# Patient Record
Sex: FEMALE | Race: OTHER | URBAN - METROPOLITAN AREA
[De-identification: names, ages, dates, MRNs, and addresses within clinical notes are randomized per-mention and may not be internally consistent; named-entity substitution may affect disease eponyms.]

---

## 2017-07-18 ENCOUNTER — INPATIENT (INPATIENT)
Facility: HOSPITAL | Age: 57
LOS: 1 days | Discharge: HOME | DRG: 392 | End: 2017-07-20
Attending: INTERNAL MEDICINE | Admitting: INTERNAL MEDICINE
Payer: COMMERCIAL

## 2017-07-18 VITALS
OXYGEN SATURATION: 99 % | SYSTOLIC BLOOD PRESSURE: 157 MMHG | TEMPERATURE: 100 F | HEART RATE: 65 BPM | RESPIRATION RATE: 18 BRPM | DIASTOLIC BLOOD PRESSURE: 80 MMHG

## 2017-07-18 DIAGNOSIS — K52.9 NONINFECTIVE GASTROENTERITIS AND COLITIS, UNSPECIFIED: ICD-10-CM

## 2017-07-18 DIAGNOSIS — Z29.9 ENCOUNTER FOR PROPHYLACTIC MEASURES, UNSPECIFIED: ICD-10-CM

## 2017-07-18 DIAGNOSIS — E78.00 PURE HYPERCHOLESTEROLEMIA, UNSPECIFIED: ICD-10-CM

## 2017-07-18 DIAGNOSIS — Z98.891 HISTORY OF UTERINE SCAR FROM PREVIOUS SURGERY: Chronic | ICD-10-CM

## 2017-07-18 DIAGNOSIS — R55 SYNCOPE AND COLLAPSE: ICD-10-CM

## 2017-07-18 DIAGNOSIS — I10 ESSENTIAL (PRIMARY) HYPERTENSION: ICD-10-CM

## 2017-07-18 LAB
ALBUMIN SERPL ELPH-MCNC: 4.8 G/DL — SIGNIFICANT CHANGE UP (ref 3.3–5)
ALP SERPL-CCNC: 79 U/L — SIGNIFICANT CHANGE UP (ref 40–120)
ALT FLD-CCNC: 27 U/L RC — SIGNIFICANT CHANGE UP (ref 10–45)
ANION GAP SERPL CALC-SCNC: 17 MMOL/L — SIGNIFICANT CHANGE UP (ref 5–17)
APTT BLD: 24.7 SEC — LOW (ref 27.5–37.4)
AST SERPL-CCNC: 26 U/L — SIGNIFICANT CHANGE UP (ref 10–40)
BASE EXCESS BLDV CALC-SCNC: 0.2 MMOL/L — SIGNIFICANT CHANGE UP (ref -2–2)
BASOPHILS # BLD AUTO: 0.1 K/UL — SIGNIFICANT CHANGE UP (ref 0–0.2)
BASOPHILS # BLD AUTO: 0.1 K/UL — SIGNIFICANT CHANGE UP (ref 0–0.2)
BASOPHILS NFR BLD AUTO: 0.8 % — SIGNIFICANT CHANGE UP (ref 0–2)
BASOPHILS NFR BLD AUTO: 1 % — SIGNIFICANT CHANGE UP (ref 0–2)
BILIRUB SERPL-MCNC: 1.2 MG/DL — SIGNIFICANT CHANGE UP (ref 0.2–1.2)
BUN SERPL-MCNC: 13 MG/DL — SIGNIFICANT CHANGE UP (ref 7–23)
CA-I SERPL-SCNC: 1.21 MMOL/L — SIGNIFICANT CHANGE UP (ref 1.12–1.3)
CALCIUM SERPL-MCNC: 9.5 MG/DL — SIGNIFICANT CHANGE UP (ref 8.4–10.5)
CHLORIDE BLDV-SCNC: 102 MMOL/L — SIGNIFICANT CHANGE UP (ref 96–108)
CHLORIDE SERPL-SCNC: 99 MMOL/L — SIGNIFICANT CHANGE UP (ref 96–108)
CO2 BLDV-SCNC: 27 MMOL/L — SIGNIFICANT CHANGE UP (ref 22–30)
CO2 SERPL-SCNC: 20 MMOL/L — LOW (ref 22–31)
CREAT SERPL-MCNC: 0.63 MG/DL — SIGNIFICANT CHANGE UP (ref 0.5–1.3)
EOSINOPHIL # BLD AUTO: 0 K/UL — SIGNIFICANT CHANGE UP (ref 0–0.5)
EOSINOPHIL # BLD AUTO: 0 K/UL — SIGNIFICANT CHANGE UP (ref 0–0.5)
EOSINOPHIL NFR BLD AUTO: 0.3 % — SIGNIFICANT CHANGE UP (ref 0–6)
EOSINOPHIL NFR BLD AUTO: 0.4 % — SIGNIFICANT CHANGE UP (ref 0–6)
GAS PNL BLDV: 135 MMOL/L — LOW (ref 136–145)
GAS PNL BLDV: SIGNIFICANT CHANGE UP
GAS PNL BLDV: SIGNIFICANT CHANGE UP
GLUCOSE BLDV-MCNC: 116 MG/DL — HIGH (ref 70–99)
GLUCOSE SERPL-MCNC: 111 MG/DL — HIGH (ref 70–99)
HCO3 BLDV-SCNC: 26 MMOL/L — SIGNIFICANT CHANGE UP (ref 21–29)
HCT VFR BLD CALC: 41.4 % — SIGNIFICANT CHANGE UP (ref 34.5–45)
HCT VFR BLD CALC: 42.2 % — SIGNIFICANT CHANGE UP (ref 34.5–45)
HCT VFR BLD CALC: 46.6 % — HIGH (ref 34.5–45)
HCT VFR BLDA CALC: 44 % — SIGNIFICANT CHANGE UP (ref 39–50)
HGB BLD CALC-MCNC: 14.2 G/DL — SIGNIFICANT CHANGE UP (ref 11.5–15.5)
HGB BLD-MCNC: 14.1 G/DL — SIGNIFICANT CHANGE UP (ref 11.5–15.5)
HGB BLD-MCNC: 14.3 G/DL — SIGNIFICANT CHANGE UP (ref 11.5–15.5)
HGB BLD-MCNC: 15.4 G/DL — SIGNIFICANT CHANGE UP (ref 11.5–15.5)
INR BLD: 0.99 RATIO — SIGNIFICANT CHANGE UP (ref 0.88–1.16)
LACTATE BLDV-MCNC: 1.5 MMOL/L — SIGNIFICANT CHANGE UP (ref 0.7–2)
LACTATE SERPL-SCNC: 1.4 MMOL/L — SIGNIFICANT CHANGE UP (ref 0.7–2)
LYMPHOCYTES # BLD AUTO: 1.4 K/UL — SIGNIFICANT CHANGE UP (ref 1–3.3)
LYMPHOCYTES # BLD AUTO: 17.8 % — SIGNIFICANT CHANGE UP (ref 13–44)
LYMPHOCYTES # BLD AUTO: 2.2 K/UL — SIGNIFICANT CHANGE UP (ref 1–3.3)
LYMPHOCYTES # BLD AUTO: 23.8 % — SIGNIFICANT CHANGE UP (ref 13–44)
MCHC RBC-ENTMCNC: 31.4 PG — SIGNIFICANT CHANGE UP (ref 27–34)
MCHC RBC-ENTMCNC: 32.1 PG — SIGNIFICANT CHANGE UP (ref 27–34)
MCHC RBC-ENTMCNC: 32.1 PG — SIGNIFICANT CHANGE UP (ref 27–34)
MCHC RBC-ENTMCNC: 33 GM/DL — SIGNIFICANT CHANGE UP (ref 32–36)
MCHC RBC-ENTMCNC: 34 GM/DL — SIGNIFICANT CHANGE UP (ref 32–36)
MCHC RBC-ENTMCNC: 34 GM/DL — SIGNIFICANT CHANGE UP (ref 32–36)
MCV RBC AUTO: 94.5 FL — SIGNIFICANT CHANGE UP (ref 80–100)
MCV RBC AUTO: 94.5 FL — SIGNIFICANT CHANGE UP (ref 80–100)
MCV RBC AUTO: 95.1 FL — SIGNIFICANT CHANGE UP (ref 80–100)
MONOCYTES # BLD AUTO: 0.4 K/UL — SIGNIFICANT CHANGE UP (ref 0–0.9)
MONOCYTES # BLD AUTO: 0.4 K/UL — SIGNIFICANT CHANGE UP (ref 0–0.9)
MONOCYTES NFR BLD AUTO: 4.7 % — SIGNIFICANT CHANGE UP (ref 2–14)
MONOCYTES NFR BLD AUTO: 5.2 % — SIGNIFICANT CHANGE UP (ref 2–14)
NEUTROPHILS # BLD AUTO: 5.9 K/UL — SIGNIFICANT CHANGE UP (ref 1.8–7.4)
NEUTROPHILS # BLD AUTO: 6.4 K/UL — SIGNIFICANT CHANGE UP (ref 1.8–7.4)
NEUTROPHILS NFR BLD AUTO: 70.2 % — SIGNIFICANT CHANGE UP (ref 43–77)
NEUTROPHILS NFR BLD AUTO: 75.7 % — SIGNIFICANT CHANGE UP (ref 43–77)
OTHER CELLS CSF MANUAL: 13 ML/DL — LOW (ref 18–22)
PCO2 BLDV: 47 MMHG — SIGNIFICANT CHANGE UP (ref 35–50)
PH BLDV: 7.36 — SIGNIFICANT CHANGE UP (ref 7.35–7.45)
PLAT MORPH BLD: ABNORMAL
PLATELET # BLD AUTO: 161 K/UL — SIGNIFICANT CHANGE UP (ref 150–400)
PLATELET # BLD AUTO: 169 K/UL — SIGNIFICANT CHANGE UP (ref 150–400)
PLATELET # BLD AUTO: ABNORMAL (ref 150–400)
PO2 BLDV: 38 MMHG — SIGNIFICANT CHANGE UP (ref 25–45)
POTASSIUM BLDV-SCNC: 4.1 MMOL/L — SIGNIFICANT CHANGE UP (ref 3.5–5)
POTASSIUM SERPL-MCNC: 4.6 MMOL/L — SIGNIFICANT CHANGE UP (ref 3.5–5.3)
POTASSIUM SERPL-SCNC: 4.6 MMOL/L — SIGNIFICANT CHANGE UP (ref 3.5–5.3)
PROT SERPL-MCNC: 8 G/DL — SIGNIFICANT CHANGE UP (ref 6–8.3)
PROTHROM AB SERPL-ACNC: 10.8 SEC — SIGNIFICANT CHANGE UP (ref 9.8–12.7)
RBC # BLD: 4.38 M/UL — SIGNIFICANT CHANGE UP (ref 3.8–5.2)
RBC # BLD: 4.46 M/UL — SIGNIFICANT CHANGE UP (ref 3.8–5.2)
RBC # BLD: 4.9 M/UL — SIGNIFICANT CHANGE UP (ref 3.8–5.2)
RBC # FLD: 12 % — SIGNIFICANT CHANGE UP (ref 10.3–14.5)
RBC # FLD: 12.1 % — SIGNIFICANT CHANGE UP (ref 10.3–14.5)
RBC # FLD: 12.1 % — SIGNIFICANT CHANGE UP (ref 10.3–14.5)
RBC BLD AUTO: SIGNIFICANT CHANGE UP
SAO2 % BLDV: 64 % — LOW (ref 67–88)
SODIUM SERPL-SCNC: 136 MMOL/L — SIGNIFICANT CHANGE UP (ref 135–145)
TROPONIN T SERPL-MCNC: <0.01 NG/ML — SIGNIFICANT CHANGE UP (ref 0–0.06)
WBC # BLD: 10 K/UL — SIGNIFICANT CHANGE UP (ref 3.8–10.5)
WBC # BLD: 7.8 K/UL — SIGNIFICANT CHANGE UP (ref 3.8–10.5)
WBC # BLD: 9.2 K/UL — SIGNIFICANT CHANGE UP (ref 3.8–10.5)
WBC # FLD AUTO: 10 K/UL — SIGNIFICANT CHANGE UP (ref 3.8–10.5)
WBC # FLD AUTO: 7.8 K/UL — SIGNIFICANT CHANGE UP (ref 3.8–10.5)
WBC # FLD AUTO: 9.2 K/UL — SIGNIFICANT CHANGE UP (ref 3.8–10.5)

## 2017-07-18 PROCEDURE — 93010 ELECTROCARDIOGRAM REPORT: CPT

## 2017-07-18 PROCEDURE — 99223 1ST HOSP IP/OBS HIGH 75: CPT

## 2017-07-18 PROCEDURE — 74177 CT ABD & PELVIS W/CONTRAST: CPT | Mod: 26

## 2017-07-18 PROCEDURE — 71010: CPT | Mod: 26

## 2017-07-18 PROCEDURE — 99285 EMERGENCY DEPT VISIT HI MDM: CPT | Mod: 25

## 2017-07-18 PROCEDURE — 76937 US GUIDE VASCULAR ACCESS: CPT | Mod: 26

## 2017-07-18 RX ORDER — SODIUM CHLORIDE 9 MG/ML
3 INJECTION INTRAMUSCULAR; INTRAVENOUS; SUBCUTANEOUS ONCE
Qty: 0 | Refills: 0 | Status: COMPLETED | OUTPATIENT
Start: 2017-07-18 | End: 2017-07-18

## 2017-07-18 RX ORDER — CIPROFLOXACIN LACTATE 400MG/40ML
400 VIAL (ML) INTRAVENOUS EVERY 12 HOURS
Qty: 0 | Refills: 0 | Status: DISCONTINUED | OUTPATIENT
Start: 2017-07-18 | End: 2017-07-20

## 2017-07-18 RX ORDER — SODIUM CHLORIDE 9 MG/ML
1000 INJECTION INTRAMUSCULAR; INTRAVENOUS; SUBCUTANEOUS
Qty: 0 | Refills: 0 | Status: DISCONTINUED | OUTPATIENT
Start: 2017-07-18 | End: 2017-07-20

## 2017-07-18 RX ORDER — ACETAMINOPHEN 500 MG
650 TABLET ORAL EVERY 6 HOURS
Qty: 0 | Refills: 0 | Status: DISCONTINUED | OUTPATIENT
Start: 2017-07-18 | End: 2017-07-20

## 2017-07-18 RX ORDER — METRONIDAZOLE 500 MG
500 TABLET ORAL ONCE
Qty: 0 | Refills: 0 | Status: COMPLETED | OUTPATIENT
Start: 2017-07-18 | End: 2017-07-18

## 2017-07-18 RX ORDER — ONDANSETRON 8 MG/1
4 TABLET, FILM COATED ORAL ONCE
Qty: 0 | Refills: 0 | Status: COMPLETED | OUTPATIENT
Start: 2017-07-18 | End: 2017-07-18

## 2017-07-18 RX ORDER — CIPROFLOXACIN LACTATE 400MG/40ML
400 VIAL (ML) INTRAVENOUS ONCE
Qty: 0 | Refills: 0 | Status: COMPLETED | OUTPATIENT
Start: 2017-07-18 | End: 2017-07-18

## 2017-07-18 RX ORDER — METOCLOPRAMIDE HCL 10 MG
5 TABLET ORAL THREE TIMES A DAY
Qty: 0 | Refills: 0 | Status: DISCONTINUED | OUTPATIENT
Start: 2017-07-18 | End: 2017-07-20

## 2017-07-18 RX ORDER — METRONIDAZOLE 500 MG
500 TABLET ORAL EVERY 8 HOURS
Qty: 0 | Refills: 0 | Status: DISCONTINUED | OUTPATIENT
Start: 2017-07-18 | End: 2017-07-20

## 2017-07-18 RX ADMIN — SODIUM CHLORIDE 3 MILLILITER(S): 9 INJECTION INTRAMUSCULAR; INTRAVENOUS; SUBCUTANEOUS at 11:28

## 2017-07-18 RX ADMIN — Medication 200 MILLIGRAM(S): at 16:54

## 2017-07-18 RX ADMIN — Medication 100 MILLIGRAM(S): at 18:16

## 2017-07-18 RX ADMIN — SODIUM CHLORIDE 100 MILLILITER(S): 9 INJECTION INTRAMUSCULAR; INTRAVENOUS; SUBCUTANEOUS at 23:50

## 2017-07-18 RX ADMIN — ONDANSETRON 4 MILLIGRAM(S): 8 TABLET, FILM COATED ORAL at 18:37

## 2017-07-18 NOTE — H&P ADULT - HISTORY OF PRESENT ILLNESS
57 year old female with PMH of HTN, hyperlipidemia, tricuspid regurgitation, history of chronic cough, colon polyps s/p removal (2013), GERD, and uterine fibroids; presents after having bloody diarrhea since last night. She reports that she ate dinner, then took ASA and her statin and started having bloody diarrhea and then fainted on the toilet. She also reports severe abdominal cramping, which she said was 10/10. She went to her PMD this morning and they did an EKG which showed some new Q waves in leads V1-V2 compared to an old EKG. She was then sent to the ED.   She also reports some chills, nausea, and chest discomfort. She denies fever. She denies similar episodes in the past. She denies eating anything unusual last night. She reports that her family ate the same dinner as her and didn't have any symptoms. She denies any family history of colitis or GI problems. She reports being in her usual state of health until last evening.   She was given IV cipro and IV flagyl in the ED.

## 2017-07-18 NOTE — CONSULT NOTE ADULT - PROBLEM SELECTOR RECOMMENDATION 9
Send for stool studies o and p and cultures  Cipro 400 mg IV q 12  and flagyl 500 mg IV q8  other supportive care

## 2017-07-18 NOTE — ED ADULT NURSE NOTE - OBJECTIVE STATEMENT
56 Y/O F ambulatory via walkin presenting with lower abd cramping that started yesterday as per pt. Pt states she had multiple bloody diarrhea episodes with clots yesterday and today. She states her stools today are mucous with clots. Pt states she fainted on the toilet. Denies head inj. She states she slumped down on the toilet but did not fall. Pt states she feels dizzy at times. Denies chest pain, sob, n,v, palpitations, diaphoresis. Pt states she is on aspirin. Pt states is weak. Pt is aaox4. Gross neuro intact. Lungs clear throughout bilat. S1 and S2 heard. Abd soft, nondistended, tender in the lower abd pain. + bs in all 4 quadrants. Denies urinary s&s. Skin w.d.i Safety and comfort measures maintained. Family at bedside.

## 2017-07-18 NOTE — ED PROVIDER NOTE - MEDICAL DECISION MAKING DETAILS
58 y/o female presents to the ED c/o abdominal cramps with blood in stool x1 day. Likely lower GI bleed vs. infectious diarrhea vs ischemic bowel. Will obtain blood work, CT abd/pel, and admit.

## 2017-07-18 NOTE — ED PROVIDER NOTE - OBJECTIVE STATEMENT
56 y/o female with PMHx of Colon Polyps, Fibroids, GERD, HTN, and HLD presents to the ED c/o 8/10 abdominal cramps with 6-8 episodes of watery/bloody diarrhea x1 day. Reports she synopsized yesterday on the toilet bowl, with no head trauma. Reports she was seen today by PCP Dr. Sigrid Menjivar who found +blood in stool and told pt to come to the ED. Pt also endorses weakness and fatigue. Reports she took ASA 81mg last night. Denies fever, chills, n/v.

## 2017-07-18 NOTE — ED PROVIDER NOTE - PROGRESS NOTE DETAILS
spoke w Dr Amanda Zendejas, will admit for ascending colitis. Low suspicion for ischemic colitis. Lactate wnl, comfortable, no risks factors. Will admit for GI consult

## 2017-07-18 NOTE — H&P ADULT - PROBLEM SELECTOR PLAN 2
-Most likely vasovagal syncope after large bloody BM. Possibly also due to acute blood/volume loss.  -Repeat EKG and trend troponins. -New Q waves seen on outside EKG, but may not be related to current episode since likely old cardiac insult without acute EKG changes.

## 2017-07-18 NOTE — ED PROVIDER NOTE - GASTROINTESTINAL, MLM
Abdomen soft, non-tender, no guarding. NIESHA with mild pink mucous, GUAIAC positive Abdomen soft, non-tender, no guarding. No CVA tenderness NIESHA with mild pink mucous, GUAIAC positive

## 2017-07-18 NOTE — H&P ADULT - NSHPLABSRESULTS_GEN_ALL_CORE
Labs:                      14.3   9.2   )-----------( 169      ( 18 Jul 2017 15:45 )             42.2     07-18    136  |  99  |  13  ----------------------------<  111<H>  4.6   |  20<L>  |  0.63    Ca    9.5      18 Jul 2017 11:56    TPro  8.0  /  Alb  4.8  /  TBili  1.2  /  DBili  x   /  AST  26  /  ALT  27  /  AlkPhos  79  07-18        PT/INR - ( 18 Jul 2017 12:58 )   PT: 10.8 sec;   INR: 0.99 ratio      PTT - ( 18 Jul 2017 12:58 )  PTT:24.7 sec    Imaging:    CT ABDOMEN AND PELVIS with IVC:  IMPRESSION:   Acute descending colitis.  Small hiatal hernia with prominent distal paraesophageal lymph node.    CHEST SINGLE AP:  IMPRESSION: Clear lungs.      EKG in house: NSR. HR 76bpm.   EKG from PMD this am: NSR at 66bpm. Q waves in V1 and V2. No acute ST or T wave changes. Labs:                      14.3   9.2   )-----------( 169      ( 18 Jul 2017 15:45 )             42.2     07-18    136  |  99  |  13  ----------------------------<  111<H>  4.6   |  20<L>  |  0.63    Ca    9.5      18 Jul 2017 11:56    TPro  8.0  /  Alb  4.8  /  TBili  1.2  /  DBili  x   /  AST  26  /  ALT  27  /  AlkPhos  79  07-18        PT/INR - ( 18 Jul 2017 12:58 )   PT: 10.8 sec;   INR: 0.99 ratio      PTT - ( 18 Jul 2017 12:58 )  PTT:24.7 sec    Imaging:    CT ABDOMEN AND PELVIS with IVC:  IMPRESSION:   Acute descending colitis.  Small hiatal hernia with prominent distal paraesophageal lymph node.    CHEST SINGLE AP:  IMPRESSION: Clear lungs.  CXR reviewed by me.    EKG in house: NSR. HR 76bpm.   EKG from PMD this am: NSR at 66bpm. Q waves in V1 and V2. No acute ST or T wave changes.  EKG reviewed by me.

## 2017-07-18 NOTE — H&P ADULT - ASSESSMENT
57 year old female with PMH of HTN, hyperlipidemia, tricuspid regurgitation, history of chronic cough, colon polyps s/p removal (2013), GERD, and uterine fibroids; presents after having bloody diarrhea since last night. She reports that she ate dinner, then took ASA and her statin and started having bloody diarrhea and then fainted on the toilet. She also reports severe abdominal cramping, which she said was 10/10. She went to her PMD this morning and they did an EKG which showed some new Q waves in leads V1-V2 compared to an old EKG. She was then sent to the ED. 57 year old female with PMH of HTN, hyperlipidemia, tricuspid regurgitation, history of chronic cough, colon polyps s/p removal (2013), GERD, and uterine fibroids; presents after having bloody diarrhea and abdominal cramping since last night with one syncopal episode and new Q waves on outside EKG. CT abdomen consistent with acute descending colitis. Patient afebrile without white count. Pain out of proportion to exam. Differential includes infectious vs ischemic colitis.

## 2017-07-18 NOTE — ED ADULT TRIAGE NOTE - CCCP TRG CHIEF CMPLNT
bloody stools today/abd cramps, diarrhea x 1 day, syncopized on toilet bowl yesterday, no head injury

## 2017-07-18 NOTE — CONSULT NOTE ADULT - ASSESSMENT
57F with htn admitted with syncope secondary to abdominal pain and bloody diarrhea  CT scan consistent with descending colitis

## 2017-07-18 NOTE — H&P ADULT - FAMILY HISTORY
<<-----Click on this checkbox to enter Family History Family history of hypertension     Father  Still living? No  Family history of acute myocardial infarction, Age at diagnosis: Age Unknown

## 2017-07-18 NOTE — H&P ADULT - NSHPREVIEWOFSYSTEMS_GEN_ALL_CORE
REVIEW OF SYSTEMS:    CONSTITUTIONAL: No weakness, fevers or chills  ENMT:  No ear pain, No vertigo or throat pain  EYES: No visual changes  or photophobia  NECK: No pain or stiffness  RESPIRATORY: No cough, wheezing, hemoptysis; No shortness of breath  CARDIOVASCULAR: No chest pain or palpitations  GASTROINTESTINAL: No abdominal or epigastric pain. No nausea, vomiting, or hematemesis; No diarrhea or constipation. No melena or hematochezia.  MUSCULOSKELETAL: No joint swelling  or warmth.  GENITOURINARY: No dysuria, frequency or hematuria  NEUROLOGICAL: No numbness or weakness  PSYCHIATRIC: No depression or anhedonia.  ENDOCRINE: No sx hypoglycemic episodes.  SKIN: No itching, burning, rashes, or lesions REVIEW OF SYSTEMS:  CONSTITUTIONAL: She reports chills.   ENMT:  No ear pain, No vertigo or throat pain  EYES: No visual changes  or photophobia  NECK: No pain or stiffness  RESPIRATORY: No cough, wheezing, hemoptysis; No shortness of breath  CARDIOVASCULAR: She reports chest discomfort.  GASTROINTESTINAL: Diffuse abdominal cramping. Nausea. Bloody diarrhea.   MUSCULOSKELETAL: No joint swelling  or warmth.  GENITOURINARY: No dysuria, frequency or hematuria  NEUROLOGICAL: No numbness or weakness.  PSYCHIATRIC: No depression or anhedonia.  ENDOCRINE: No sx hypoglycemic episodes.  SKIN: No itching, burning, rashes, or lesions

## 2017-07-18 NOTE — CONSULT NOTE ADULT - ASSESSMENT
57 with acute onset abdominal pain and bloody diarrhea. syncopized after first two bowel movements  difficult to say with certainty infectious versus ischemia.   agree to treat as infectious and with suuportive care  cipro 500mg twice daily  flagyl 500mg three times a day  npo tonight and then clear liquid diet tomorrow if tolerates  IVF  IV tylenol or bentyl 10mg prn for cramping  reglan as needed for nausea  defer colonscopy in setting of acute colitis unless sytmpoms to not improve  recommedn cardiac evalution given new q waves and quesiton of ischemic colitis  check stool studies including c diff to rule otu infectious etiology  will follow along  please call with questions  [phone]

## 2017-07-18 NOTE — H&P ADULT - NSHPSOCIALHISTORY_GEN_ALL_CORE
. From [facility]. No smoking history. . From [facility]. No smoking history. 2 children. Homemaker. Doesn't drink alcohol.

## 2017-07-18 NOTE — ED ADULT NURSE NOTE - PMH
Cataract    Colon polyps    Cough  chronic cough  Fibroids    GERD (gastroesophageal reflux disease)    Hypercholesteremia    Hypertension    Tricuspid regurgitation

## 2017-07-18 NOTE — ED ADULT NURSE REASSESSMENT NOTE - NS ED NURSE REASSESS COMMENT FT1
Patient had 1 episode of vomiting. She denies SOB/chest pain/itchiness/throat tightness. MD silver aware. Pending bed assignment.

## 2017-07-18 NOTE — H&P ADULT - PROBLEM SELECTOR PLAN 1
-F/u GI and ID recs. -Cipro 400mg IV Q12h and flagyl 500mg IV Q8H. -Check stool cx, C. diff, O&P. -Repeat lactate. -Check CBC now and trend to monitor blood loss. -Keep NPO for now and advance to clears as tolerate tomorrow. -Reglan as needed for nausea. -Bentyl and tylenol as needed for pain. -IV NS while NPO.

## 2017-07-18 NOTE — H&P ADULT - NSHPPHYSICALEXAM_GEN_ALL_CORE
PHYSICAL EXAM:  Vital Signs Last 24 Hrs  T(C): 37 (07-18-17 @ 15:15)  T(F): 98.6 (07-18-17 @ 15:15), Max: 99.5 (07-18-17 @ 10:33)  HR: 83 (07-18-17 @ 15:15) (65 - 83)  BP: 162/76 (07-18-17 @ 15:15)  BP(mean): --  RR: 18 (07-18-17 @ 15:15) (16 - 18)  SpO2: 99% (07-18-17 @ 15:15) (99% - 100%)  Wt(kg): --    Constitutional: NAD, awake and alert  EYES: EOMI  ENT:  Normal Hearing, no tonsillar exudates   Neck: Soft and supple, No JVD  Respiratory: Breath sounds are clear bilaterally, No wheezing, rales or rhonchi  Cardiovascular: S1 and S2, regular rate and rhythm, no Murmurs, gallops or rubs  Gastrointestinal: Bowel Sounds present, soft, nontender, nondistended, no guarding, no rebound  Extremities: No cyanosis or clubbing; warm to touch  Vascular: 2+ peripheral pulses lower ex  Neurological: A/O x 3, no focal deficits  Musculoskeletal: 5/5 strength b/l upper and lower extremities  Skin: No rashes  Psych: no depression or anhedonia  HEME: no bruises, no nose bleeds PHYSICAL EXAM:  Vital Signs Last 24 Hrs  T(C): 37 (07-18-17 @ 15:15)  T(F): 98.6 (07-18-17 @ 15:15), Max: 99.5 (07-18-17 @ 10:33)  HR: 83 (07-18-17 @ 15:15) (65 - 83)  BP: 162/76 (07-18-17 @ 15:15)  BP(mean): --  RR: 18 (07-18-17 @ 15:15) (16 - 18)  SpO2: 99% (07-18-17 @ 15:15) (99% - 100%)  Wt(kg): --    Constitutional: Appears uncomfortable.   EYES: EOMI  ENT:  Normal Hearing, no tonsillar exudates. Dry MM.  Neck: Soft and supple, No JVD  Respiratory: Breath sounds are clear bilaterally, No wheezing, rales or rhonchi  Cardiovascular: S1 and S2 normal. RRR.  Gastrointestinal: Overweight. Soft, reported cramping but not on palpation, ND.   Extremities: No cyanosis or clubbing; warm to touch. No edema.   Vascular: 2+ peripheral pulses lower ex  Neurological: A/O x 3, no focal deficits  Musculoskeletal: 5/5 strength b/l upper and lower extremities  Skin: No rashes  Psych: no depression or anhedonia  HEME: no bruises, no nose bleeds

## 2017-07-18 NOTE — ED ADULT NURSE REASSESSMENT NOTE - NS ED NURSE REASSESS COMMENT FT1
Patient returned from CT scan. Patient ambulated to restroom without difficulty. She states she is still having bright red stools in ED. She denies dizziness/weakness. Pending dispo. Will continue to monitor.

## 2017-07-19 DIAGNOSIS — R07.89 OTHER CHEST PAIN: ICD-10-CM

## 2017-07-19 DIAGNOSIS — R94.31 ABNORMAL ELECTROCARDIOGRAM [ECG] [EKG]: ICD-10-CM

## 2017-07-19 LAB
ALBUMIN SERPL ELPH-MCNC: 3.6 G/DL — SIGNIFICANT CHANGE UP (ref 3.3–5)
ALP SERPL-CCNC: 66 U/L — SIGNIFICANT CHANGE UP (ref 40–120)
ALT FLD-CCNC: 18 U/L RC — SIGNIFICANT CHANGE UP (ref 10–45)
ANION GAP SERPL CALC-SCNC: 11 MMOL/L — SIGNIFICANT CHANGE UP (ref 5–17)
AST SERPL-CCNC: 18 U/L — SIGNIFICANT CHANGE UP (ref 10–40)
BILIRUB SERPL-MCNC: 1.7 MG/DL — HIGH (ref 0.2–1.2)
BUN SERPL-MCNC: 7 MG/DL — SIGNIFICANT CHANGE UP (ref 7–23)
CALCIUM SERPL-MCNC: 8.5 MG/DL — SIGNIFICANT CHANGE UP (ref 8.4–10.5)
CHLORIDE SERPL-SCNC: 107 MMOL/L — SIGNIFICANT CHANGE UP (ref 96–108)
CO2 SERPL-SCNC: 25 MMOL/L — SIGNIFICANT CHANGE UP (ref 22–31)
CREAT SERPL-MCNC: 0.89 MG/DL — SIGNIFICANT CHANGE UP (ref 0.5–1.3)
GLUCOSE SERPL-MCNC: 141 MG/DL — HIGH (ref 70–99)
HCT VFR BLD CALC: 39.7 % — SIGNIFICANT CHANGE UP (ref 34.5–45)
HGB BLD-MCNC: 13.1 G/DL — SIGNIFICANT CHANGE UP (ref 11.5–15.5)
LACTATE SERPL-SCNC: 1.4 MMOL/L — SIGNIFICANT CHANGE UP (ref 0.7–2)
MAGNESIUM SERPL-MCNC: 2 MG/DL — SIGNIFICANT CHANGE UP (ref 1.6–2.6)
MCHC RBC-ENTMCNC: 30.1 PG — SIGNIFICANT CHANGE UP (ref 27–34)
MCHC RBC-ENTMCNC: 33 GM/DL — SIGNIFICANT CHANGE UP (ref 32–36)
MCV RBC AUTO: 91.3 FL — SIGNIFICANT CHANGE UP (ref 80–100)
PHOSPHATE SERPL-MCNC: 3.3 MG/DL — SIGNIFICANT CHANGE UP (ref 2.5–4.5)
PLATELET # BLD AUTO: 122 K/UL — LOW (ref 150–400)
POTASSIUM SERPL-MCNC: 3.7 MMOL/L — SIGNIFICANT CHANGE UP (ref 3.5–5.3)
POTASSIUM SERPL-SCNC: 3.7 MMOL/L — SIGNIFICANT CHANGE UP (ref 3.5–5.3)
PROT SERPL-MCNC: 6.2 G/DL — SIGNIFICANT CHANGE UP (ref 6–8.3)
RBC # BLD: 4.35 M/UL — SIGNIFICANT CHANGE UP (ref 3.8–5.2)
RBC # FLD: 13.7 % — SIGNIFICANT CHANGE UP (ref 10.3–14.5)
SODIUM SERPL-SCNC: 143 MMOL/L — SIGNIFICANT CHANGE UP (ref 135–145)
TROPONIN T SERPL-MCNC: <0.01 NG/ML — SIGNIFICANT CHANGE UP (ref 0–0.06)
WBC # BLD: 8.29 K/UL — SIGNIFICANT CHANGE UP (ref 3.8–10.5)
WBC # FLD AUTO: 8.29 K/UL — SIGNIFICANT CHANGE UP (ref 3.8–10.5)

## 2017-07-19 RX ADMIN — Medication 100 MILLIGRAM(S): at 02:01

## 2017-07-19 RX ADMIN — Medication 100 MILLIGRAM(S): at 09:48

## 2017-07-19 RX ADMIN — Medication 650 MILLIGRAM(S): at 04:59

## 2017-07-19 RX ADMIN — Medication 200 MILLIGRAM(S): at 17:15

## 2017-07-19 RX ADMIN — Medication 100 MILLIGRAM(S): at 22:02

## 2017-07-19 RX ADMIN — Medication 100 MILLIGRAM(S): at 15:36

## 2017-07-19 RX ADMIN — Medication 200 MILLIGRAM(S): at 05:02

## 2017-07-19 RX ADMIN — Medication 5 MILLIGRAM(S): at 02:04

## 2017-07-19 RX ADMIN — Medication 650 MILLIGRAM(S): at 09:09

## 2017-07-19 RX ADMIN — SODIUM CHLORIDE 100 MILLILITER(S): 9 INJECTION INTRAMUSCULAR; INTRAVENOUS; SUBCUTANEOUS at 09:48

## 2017-07-19 RX ADMIN — Medication 650 MILLIGRAM(S): at 02:01

## 2017-07-19 NOTE — PROGRESS NOTE ADULT - ASSESSMENT
57 with descending colitis -- infectious versus ischemia.   Agree w/ IV abx : Cipro and Flagyl  Advance to clear liq diet  Bentyl 10mg prn for cramping  F/U cardiology  Paraesophageal LN noted on imaging. Patient states she had recent EGD as outpatient for reflux which was unremarkable. Consider interval imaging vs. EGD/EUS as outpatient.

## 2017-07-19 NOTE — CONSULT NOTE ADULT - SUBJECTIVE AND OBJECTIVE BOX
Patient is a 57y old  Female who presents with a chief complaint of abdominal pain and blood diarrhea for 24 hours    HPI:  57 year old woman who was in usual state of health until last night when she had lower abdominal cramping and had two episodes of soft bowel movements. While having a bowel movement she syncopized and fell to floor. When she regained consciousness she had more crmaping paina dn bloody diarrhea. She saw her PMD this AM. noted new q waves on ekg from prior and snet her to ER. denies fevers or chills but does endorse nausea. feels some pressure over chest but thinks it is gas. no recent antibiotic use. No sick contacts. went to mall with family history and had some chicken which was eaten by everyone.   last c scope approx 4 years ago with diverticulosis  egd 2015 with hiatal hernia    PAST MEDICAL & SURGICAL HISTORY:  Cataract  Fibroids  Colon polyps  Tricuspid regurgitation  Cough: chronic cough  GERD (gastroesophageal reflux disease)  Hypertension  Hypercholesteremia      MEDICATIONS  (STANDING):  metroNIDAZOLE  IVPB 500 milliGRAM(s) IV Intermittent once    MEDICATIONS  (PRN):      Allergies    No Known Allergies    Intolerances        Review of Systems:    General:  No wt loss, fevers, chills, night sweats,fatigue,   CV:  No pain, palpitatioins, hypo/hypertension  Resp:  No dyspnea, cough, tachypnea, wheezing  GI:  abdominal pain No nausea, No vomiting,bloody diarrhea, No constipatiion, No weight loss, No fever, No pruritis, rectal bleeding, No tarry stools, No dysphagia,  :  No pain, bleeding, incontinence, nocturia  Muscle:  No pain, weakness  Neuro:  No weakness, tingling, memory problems  Psych:  No fatigue, insomnia, mood problems, depression  Endocrine:  No polyuria, polydypsia, cold/heat intolerance  Heme:  No petechiae, ecchymosis, easy bruisability  Skin:  No rash, tattoos, scars, edema      Vital Signs Last 24 Hrs  T(C): 37 (18 Jul 2017 15:15), Max: 37.5 (18 Jul 2017 10:33)  T(F): 98.6 (18 Jul 2017 15:15), Max: 99.5 (18 Jul 2017 10:33)  HR: 83 (18 Jul 2017 15:15) (65 - 83)  BP: 162/76 (18 Jul 2017 15:15) (157/80 - 162/76)  BP(mean): --  RR: 18 (18 Jul 2017 15:15) (16 - 18)  SpO2: 99% (18 Jul 2017 15:15) (99% - 100%)    PHYSICAL EXAM:    Constitutional: NAD, well-developed  Neck: No LAD, supple  Respiratory: CTA and P  Cardiovascular: S1 and S2, RRR, no M  Gastrointestinal: BS+, soft, NT/ND, neg HSM,  Extremities: No peripheral edema, neg clubing, cyanosis  Vascular: 2+ peripheral pulses  Neurological: A/O x 3, no focal deficits  Psychiatric: Normal mood, normal affect  Skin: No rashes        LABS:                        14.3   9.2   )-----------( 169      ( 18 Jul 2017 15:45 )             42.2     07-18    136  |  99  |  13  ----------------------------<  111<H>  4.6   |  20<L>  |  0.63    Ca    9.5      18 Jul 2017 11:56    TPro  8.0  /  Alb  4.8  /  TBili  1.2  /  DBili  x   /  AST  26  /  ALT  27  /  AlkPhos  79  07-18    PT/INR - ( 18 Jul 2017 12:58 )   PT: 10.8 sec;   INR: 0.99 ratio         PTT - ( 18 Jul 2017 12:58 )  PTT:24.7 sec    LIVER FUNCTIONS - ( 18 Jul 2017 11:56 )  Alb: 4.8 g/dL / Pro: 8.0 g/dL / ALK PHOS: 79 U/L / ALT: 27 U/L RC / AST: 26 U/L / GGT: x           Hemoglobin: 14.3 g/dL (07-18-17 @ 15:45)  Hemoglobin: 15.4 g/dL (07-18-17 @ 11:56)    RADIOLOGY & ADDITIONAL TESTS:    EXAM:  CT ABDOMEN AND PELVIS IC                            PROCEDURE DATE:  07/18/2017            INTERPRETATION:  CLINICAL INFORMATION: Bloody diarrhea. Rule out colitis.    COMPARISON: None    PROCEDURE:   CT of the Abdomen and Pelvis was performed with intravenous contrast.   Intravenous contrast: 90 ml Omnipaque 350. 10 ml discarded.  Oral contrast: None.  Sagittal and coronal reformats were performed.    FINDINGS:    LOWER CHEST: Small hiatal hernia. A nonspecific 6 x 4 mm distal   paraesophageal lymph node is noted.    LIVER: Within normal limits.  BILE DUCTS: Normal caliber.  GALLBLADDER: Within normal limits.  SPLEEN: Within normal limits.  PANCREAS: Within normal limits.  ADRENALS: Within normal limits.  KIDNEYS/URETERS: Within normal limits.    BLADDER: Within normal limits.  REPRODUCTIVE ORGANS: The uterus and adnexa are within normal limits.    BOWEL: No bowel obstruction. There is bowel wall thickening of the   descending colon to the level of the splenic flexure with mild   surrounding fat stranding. Findings are consistent with acute colitis.   Appendix is normal. Few scattered sigmoid colon diverticula with no   evidence of diverticulitis.  PERITONEUM: No ascites.  VESSELS:  Within normal limits.  RETROPERITONEUM: Nolymphadenopathy.    ABDOMINAL WALL: Small fat-containing umbilical hernia.  BONES: Within normal limits.    IMPRESSION:   Acute descending colitis.    Small hiatal hernia with prominent distal paraesophageal lymph node.   Correlation with upper endoscopy is recommended.    Findings were discussed with Dr. Carter by Dr. Freitas on 7/18/2017 at   4:13 PM with readback.                      LOIS FREITAS M.D., RADIOLOGY RESIDENT  This document has been electronically signed.  LAYLA SON M.D., ATTENDING RADIOLOGIST  This document has been electronically signed. Jul 18 2017  4:15PM
CHIEF COMPLAINT: Abdominal pain and palpitations     HISTORY OF PRESENT ILLNESS: 57 year old female presents after having bloody diarrhea since last night. She reports that she ate dinner, then took ASA and her statin and started having bloody diarrhea and then fainted on the toilet. She also reports severe abdominal cramping, which she said was 10/10. She went to her PMD this morning and they did an EKG which showed some new Q waves in leads V1-V2 compared to an old EKG. She was then sent to the ED.   She also reports some chills, nausea, and chest discomfort and palpitations. She denies fever. She denies similar episodes in the past. She denies eating anything unusual last night. She reports that her family ate the same dinner as her and didn't have any symptoms. She denies any family history of colitis or GI problems. She reports being in her usual state of health until last evening.   She was given IV cipro and IV flagyl in the ED.        PAST MEDICAL & SURGICAL HISTORY:  Cataract  Fibroids  Colon polyps  Tricuspid regurgitation  Cough: chronic cough  GERD (gastroesophageal reflux disease)  Hypertension  Hypercholesteremia  History of  delivery          MEDICATIONS:    ciprofloxacin   IVPB 400 milliGRAM(s) IV Intermittent every 12 hours  metroNIDAZOLE  IVPB 500 milliGRAM(s) IV Intermittent every 8 hours      acetaminophen   Tablet. 650 milliGRAM(s) Oral every 6 hours PRN    metoclopramide 5 milliGRAM(s) Oral three times a day PRN  dicyclomine 10 milliGRAM(s) Oral four times a day before meals PRN      sodium chloride 0.9%. 1000 milliLiter(s) IV Continuous <Continuous>      FAMILY HISTORY:  Family history of hypertension  Family history of acute myocardial infarction (Father)      SOCIAL HISTORY:    [ ] Non-smoker  [ ] Smoker  [ ] Alcohol    Allergies    No Known Allergies    Intolerances    	    REVIEW OF SYSTEMS:  CONSTITUTIONAL: No fever, weight loss, or fatigue  EYES: No eye pain, visual disturbances, or discharge  ENMT:  No difficulty hearing, tinnitus, vertigo; No sinus or throat pain  NECK: No pain or stiffness  RESPIRATORY: No cough, wheezing, chills or hemoptysis; No Shortness of Breath  CARDIOVASCULAR: + chest pain, palpitations, no passing out, dizziness, or leg swelling  GASTROINTESTINAL: No abdominal or epigastric pain. No nausea, vomiting, or hematemesis; + diarrhea , no constipation. No melena +hematochezia.  GENITOURINARY: No dysuria, frequency, hematuria, or incontinence  NEUROLOGICAL: No headaches, memory loss, loss of strength, numbness, or tremors  SKIN: No itching, burning, rashes, or lesions   LYMPH Nodes: No enlarged glands  ENDOCRINE: No heat or cold intolerance; No hair loss  MUSCULOSKELETAL: No joint pain or swelling; No muscle, back, or extremity pain  PSYCHIATRIC: No depression, anxiety, mood swings, or difficulty sleeping  HEME/LYMPH: No easy bruising, or bleeding gums  ALLERY AND IMMUNOLOGIC: No hives or eczema	    [ ] All others negative	  [ ] Unable to obtain    PHYSICAL EXAM:  T(C): 37.1 (17 @ 09:57), Max: 37.7 (17 @ 22:15)  HR: 74 (17 @ 09:57) (65 - 83)  BP: 127/66 (17 @ 09:57) (119/72 - 162/76)  RR: 18 (17 @ 09:57) (16 - 18)  SpO2: 100% (17 @ 09:57) (95% - 100%)  Wt(kg): --  I&O's Summary      Appearance: Normal	  HEENT:   Normal oral mucosa, PERRL, EOMI	  Lymphatic: No lymphadenopathy  Cardiovascular: Normal S1 S2, No JVD, No murmurs, No edema  Respiratory: Lungs clear to auscultation	  Psychiatry: A & O x 3, Mood & affect appropriate  Gastrointestinal:  Soft, Non-tender, + BS	  Skin: No rashes, No ecchymoses, No cyanosis	  Neurologic: Non-focal  Extremities: Normal range of motion, No clubbing, cyanosis or edema  Vascular: Peripheral pulses palpable 2+ bilaterally    TELEMETRY: 	    ECG:  nsr , anterior infarct, NSST changes 	  RADIOLOGY:  OTHER: 	  	  LABS:	 	    CARDIAC MARKERS:                                  13.1   8.29  )-----------( 122      ( 2017 07:32 )             39.7         143  |  107  |  7   ----------------------------<  141<H>  3.7   |  25  |  0.89    Ca    8.5      2017 05:57  Phos  3.3       Mg     2.0         TPro  6.2  /  Alb  3.6  /  TBili  1.7<H>  /  DBili  x   /  AST  18  /  ALT  18  /  AlkPhos  66      proBNP:   Lipid Profile:   HgA1c:   TSH:
[facility], Division of [facility]ious [facility]  SATNAM Calvillo A. [facility]  [phone]    JAYESH COLLADO  57y, Female  [id number]    HPI--  57F was in normal state of health when she developed acute cramping abdominal pain, states it was associated with bloody diarrhea and has gone about 5 times.  Has had cramping abdominal pain in past but not bloody diarrhea.  Pain is on an off.  She had syncopal episode from the pain.   Denies recent antibiotics and no sick contacts.  No fevers, no chills, ++nausea , no vomiting.  No dysuria, no urgency.  No recent travel.  No use of nsaids    She went to see her PcP this morning and EKG showed some changes so she was sent to ED.  Currently she denies pain.    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    Travel/Environmental/[place]up[place]    Review of Systems:  A >=10-point review of systems was obtained.     Pertinent positives and negatives--  Constitutional: No fevers. No Chills. No Rigors.   Eyes: no blurry vision  ENMT: no sore throat  Cardiovascular: No chest pain. No palpitations.  Respiratory: No shortness of breath. No cough.  Gastrointestinal: No nausea or vomiting. No diarrhea or constipation.   Genitourinary: no dysuria no urgency  Musculoskeletal: no myalgias  Skin: no rash  Neurologic: no headache      Review of systems otherwise negative except as previously noted.    Physical Exam--  Vital Signs: T(F): 98.6 (07-18-17 @ 15:15), Max: 99.5 (07-18-17 @ 10:33)  HR: 83 (07-18-17 @ 15:15)  BP: 162/76 (07-18-17 @ 15:15)  RR: 18 (07-18-17 @ 15:15)  SpO2: 99% (07-18-17 @ 15:15)  Wt(kg): --  General: Nontoxic-appearing Female in no acute distress.  HEENT: AT/NC. Anicteric. Conjunctiva pink and moist. Oropharynx clear. Dentition fair.  Neck: Not rigid. No sense of mass.  Nodes: None palpable.  Lungs: Clear bilaterally without rales, wheezing or rhonchi  Heart: Regular rate and rhythm. +systolic Murmur. No rub. No gallop. No palpable thrill.  Abdomen: Bowel sounds present and normoactive. Soft. Nondistended. Nontender.   Back: No spinal tenderness. No costovertebral angle tenderness.   Extremities: No cyanosis or clubbing. No edema.   Skin: Warm. Dry. Good turgor. No rash. No vasculitic stigmata.  .         Laboratory & Imaging Data--  CBC                        14.3   9.2   )-----------( 169      ( 18 Jul 2017 15:45 )             42.2       Chemistries  07-18    136  |  99  |  13  ----------------------------<  111<H>  4.6   |  20<L>  |  0.63    Ca    9.5      18 Jul 2017 11:56    TPro  8.0  /  Alb  4.8  /  TBili  1.2  /  DBili  x   /  AST  26  /  ALT  27  /  AlkPhos  79  07-18      Culture Data  < from: CT Abdomen and Pelvis w/ IV Cont (07.18.17 @ 15:18) >    EXAM:  CT ABDOMEN AND PELVIS IC                            PROCEDURE DATE:  07/18/2017            INTERPRETATION:  CLINICAL INFORMATION: Bloody diarrhea. Rule out colitis.  < from: Xray Chest 1 View AP/PA (07.18.17 @ 11:33) >  EXAM:  CHEST SINGLE AP OR PA                            PROCEDURE DATE:  07/18/2017            INTERPRETATION:  HISTORY: GI bleed. Rule out pneumonia. No other clinical   history is provided.    TECHNIQUE: A single AP view of the chest was obtained.    COMPARISON: None.    FINDINGS:  The cardiac silhouette is normal in size. There are no focal   consolidations or pleural effusions. The hilar and mediastinal structures   appear unremarkable. The osseous structures are intact.    IMPRESSION: Clear lungs.        < end of copied text >    COMPARISON: None    PROCEDURE:   CT of the Abdomen and Pelvis was performed with intravenous contrast.   Intravenous contrast: 90 ml Omnipaque 350. 10 ml discarded.  Oral contrast: None.  Sagittal and coronal reformats were performed.    FINDINGS:    LOWER CHEST: Small hiatal hernia. A nonspecific 6 x 4 mm distal   paraesophageal lymph node is noted.    LIVER: Within normal limits.  BILE DUCTS: Normal caliber.  GALLBLADDER: Within normal limits.  SPLEEN: Within normal limits.  PANCREAS: Within normal limits.  ADRENALS: Within normal limits.  KIDNEYS/URETERS: Within normal limits.    BLADDER: Within normal limits.  REPRODUCTIVE ORGANS: The uterus and adnexa are within normal limits.    BOWEL: No bowel obstruction. There is bowel wall thickening of the   descending colon to the level of the splenic flexure with mild   surrounding fat stranding. Findings are consistent with acute colitis.   Appendix is normal. Few scattered sigmoid colon diverticula with no   evidence of diverticulitis.  PERITONEUM: No ascites.  VESSELS:  Within normal limits.  RETROPERITONEUM: Nolymphadenopathy.    ABDOMINAL WALL: Small fat-containing umbilical hernia.  BONES: Within normal limits.    IMPRESSION:   Acute descending colitis.    Small hiatal hernia with prominent distal paraesophageal lymph node.   Correlation with upper endoscopy is recommended.      < end of copied text >

## 2017-07-19 NOTE — PROGRESS NOTE ADULT - SUBJECTIVE AND OBJECTIVE BOX
Patient is a 57y old  Female who presents with a chief complaint of Bloody diarrhea (18 Jul 2017 19:04)      SUBJECTIVE / OVERNIGHT EVENTS:diarrhea resolved, poor appetite, on IV ABx    MEDICATIONS  (STANDING):  sodium chloride 0.9%. 1000 milliLiter(s) (100 mL/Hr) IV Continuous <Continuous>  ciprofloxacin   IVPB 400 milliGRAM(s) IV Intermittent every 12 hours  metroNIDAZOLE  IVPB 500 milliGRAM(s) IV Intermittent every 8 hours    MEDICATIONS  (PRN):  metoclopramide 5 milliGRAM(s) Oral three times a day PRN Nausea/vomitting  dicyclomine 10 milliGRAM(s) Oral four times a day before meals PRN Cramping  acetaminophen   Tablet. 650 milliGRAM(s) Oral every 6 hours PRN Moderate Pain (4 - 6)      Vital Signs Last 24 Hrs  T(F): 98 (07-19-17 @ 17:13), Max: 99.9 (07-18-17 @ 22:15)  HR: 68 (07-19-17 @ 17:13) (65 - 74)  BP: 144/82 (07-19-17 @ 17:13) (119/72 - 151/71)  RR: 18 (07-19-17 @ 17:13) (16 - 18)  SpO2: 97% (07-19-17 @ 17:13) (95% - 100%)  Telemetry:   CAPILLARY BLOOD GLUCOSE        I&O's Summary      PHYSICAL EXAM:  GENERAL: NAD, well-developed  HEAD:  Atraumatic, Normocephalic  EYES: EOMI, PERRLA, conjunctiva and sclera clear  NECK: Supple, No JVD  CHEST/LUNG: Clear to auscultation bilaterally; No wheeze  HEART: Regular rate and rhythm; No murmurs, rubs, or gallops  ABDOMEN: Soft, Nontender, Nondistended; Bowel sounds present  EXTREMITIES:  2+ Peripheral Pulses, No clubbing, cyanosis, or edema  PSYCH: AAOx3  NEUROLOGY: non-focal  SKIN: No rashes or lesions    LABS:                        13.1   8.29  )-----------( 122      ( 19 Jul 2017 07:32 )             39.7     07-19    143  |  107  |  7   ----------------------------<  141<H>  3.7   |  25  |  0.89    Ca    8.5      19 Jul 2017 05:57  Phos  3.3     07-19  Mg     2.0     07-19    TPro  6.2  /  Alb  3.6  /  TBili  1.7<H>  /  DBili  x   /  AST  18  /  ALT  18  /  AlkPhos  66  07-19    PT/INR - ( 18 Jul 2017 12:58 )   PT: 10.8 sec;   INR: 0.99 ratio         PTT - ( 18 Jul 2017 12:58 )  PTT:24.7 sec  CARDIAC MARKERS ( 19 Jul 2017 05:57 )  x     / <0.01 ng/mL / x     / x     / x      CARDIAC MARKERS ( 18 Jul 2017 19:38 )  x     / <0.01 ng/mL / x     / x     / x              RADIOLOGY & ADDITIONAL TESTS:    Imaging Personally Reviewed:    Consultant(s) Notes Reviewed:      Care Discussed with Consultants/Other Providers:

## 2017-07-19 NOTE — CHART NOTE - NSCHARTNOTEFT_GEN_A_CORE
S= pt with induration at Rt anticubital area  Rt arm examined no signs of infection  but appeared iv infiltrated  HPI:  57 year old female with PMH of HTN, hyperlipidemia, tricuspid regurgitation, history of chronic cough, colon polyps s/p removal (2013), GERD, and uterine fibroids; presents after having bloody diarrhea since last night. She reports that she ate dinner, then took ASA and her statin and started having bloody diarrhea and then fainted on the toilet. She also reports severe abdominal cramping, which she said was 10/10. She went to her PMD this morning and they did an EKG which showed some new Q waves in leads V1-V2 compared to an old EKG. She was then sent to the ED.   She also reports some chills, nausea, and chest discomfort. She denies fever. She denies similar episodes in the past. She denies eating anything unusual last night. She reports that her family ate the same dinner as her and didn't have any symptoms. She denies any family history of colitis or GI problems. She reports being in her usual state of health until last evening.   She was given IV cipro and IV flagyl in the ED. (18 Jul 2017 19:04)  Vital Signs Last 24 Hrs  T(C): 36.7 (19 Jul 2017 17:13), Max: 37.7 (18 Jul 2017 22:15)  T(F): 98 (19 Jul 2017 17:13), Max: 99.9 (18 Jul 2017 22:15)  HR: 68 (19 Jul 2017 17:13) (67 - 74)  BP: 144/82 (19 Jul 2017 17:13) (119/72 - 147/73)  BP(mean): --  RR: 18 (19 Jul 2017 17:13) (16 - 18)  SpO2: 97% (19 Jul 2017 17:13) (95% - 100%)                        13.1   8.29  )-----------( 122      ( 19 Jul 2017 07:32 )             39.7   A/p 57 yrs old with colitis s/p cipro infusion and iv site indurated  Access new PIV site and warm compressors

## 2017-07-19 NOTE — PROGRESS NOTE ADULT - SUBJECTIVE AND OBJECTIVE BOX
Feels much better today.  No abdominal pain  No bloody BM since 10 PM last night  NPO    Vital Signs Last 24 Hrs  T(C): 37.1 (19 Jul 2017 09:57), Max: 37.7 (18 Jul 2017 22:15)  T(F): 98.8 (19 Jul 2017 09:57), Max: 99.9 (18 Jul 2017 22:15)  HR: 74 (19 Jul 2017 09:57) (65 - 83)  BP: 127/66 (19 Jul 2017 09:57) (119/72 - 162/76)  BP(mean): --  RR: 18 (19 Jul 2017 09:57) (16 - 18)  SpO2: 100% (19 Jul 2017 09:57) (95% - 100%)    PHYSICAL EXAM:    Constitutional: NAD, well-developed  Neck: No LAD, supple  Respiratory: CTA and P  Cardiovascular: S1 and S2  Gastrointestinal: BS+, soft, NT/ND, neg HSM,  Extremities: No peripheral edema, neg clubing, cyanosis  Vascular: 2+ peripheral pulses  Neurological: A/O x 3, no focal deficits  Psychiatric: Normal mood, normal affect  Skin: No rashes        MEDICATIONS  (STANDING):  sodium chloride 0.9%. 1000 milliLiter(s) (100 mL/Hr) IV Continuous <Continuous>  ciprofloxacin   IVPB 400 milliGRAM(s) IV Intermittent every 12 hours  metroNIDAZOLE  IVPB 500 milliGRAM(s) IV Intermittent every 8 hours    MEDICATIONS  (PRN):  metoclopramide 5 milliGRAM(s) Oral three times a day PRN Nausea/vomitting  dicyclomine 10 milliGRAM(s) Oral four times a day before meals PRN Cramping  acetaminophen   Tablet. 650 milliGRAM(s) Oral every 6 hours PRN Moderate Pain (4 - 6)      Allergies    No Known Allergies    Intolerances          LABS:                        13.1   8.29  )-----------( 122      ( 19 Jul 2017 07:32 )             39.7                         14.1   10.0  )-----------( 161      ( 18 Jul 2017 19:38 )             41.4                         14.3   9.2   )-----------( 169      ( 18 Jul 2017 15:45 )             42.2     07-19    143  |  107  |  7   ----------------------------<  141<H>  3.7   |  25  |  0.89    Ca    8.5      19 Jul 2017 05:57  Phos  3.3     07-19  Mg     2.0     07-19    TPro  6.2  /  Alb  3.6  /  TBili  1.7<H>  /  DBili  x   /  AST  18  /  ALT  18  /  AlkPhos  66  07-19    PT/INR - ( 18 Jul 2017 12:58 )   PT: 10.8 sec;   INR: 0.99 ratio         PTT - ( 18 Jul 2017 12:58 )  PTT:24.7 sec    LIVER FUNCTIONS - ( 19 Jul 2017 05:57 )  Alb: 3.6 g/dL / Pro: 6.2 g/dL / ALK PHOS: 66 U/L / ALT: 18 U/L RC / AST: 18 U/L / GGT: x         LIVER FUNCTIONS - ( 18 Jul 2017 11:56 )  Alb: 4.8 g/dL / Pro: 8.0 g/dL / ALK PHOS: 79 U/L / ALT: 27 U/L RC / AST: 26 U/L / GGT: x           Lactate, Blood: 1.4 mmol/L (07-19-17 @ 05:49)  Lactate, Blood: 1.4 mmol/L (07-18-17 @ 19:41)      RADIOLOGY & ADDITIONAL TESTS:

## 2017-07-19 NOTE — PROGRESS NOTE ADULT - ASSESSMENT
57 with   1. descending colitis -- infectious versus ischemia.   cont w/ IV abx : Cipro and Flagyl  Advance to low residue  Bentyl 10mg prn for cramps  Paraesophageal LN noted on imaging. Patient states she had recent EGD as outpatient for reflux which was unremarkable. Consider interval imaging vs. EGD/EUS as outpatient.  2. abn EKG. no ischemic Sx, awaiting TTE, ischemic w/up on outptn basis as per cardiology

## 2017-07-20 VITALS
OXYGEN SATURATION: 98 % | HEART RATE: 66 BPM | RESPIRATION RATE: 18 BRPM | TEMPERATURE: 98 F | DIASTOLIC BLOOD PRESSURE: 76 MMHG | SYSTOLIC BLOOD PRESSURE: 124 MMHG

## 2017-07-20 LAB
ANION GAP SERPL CALC-SCNC: 11 MMOL/L — SIGNIFICANT CHANGE UP (ref 5–17)
BUN SERPL-MCNC: 7 MG/DL — SIGNIFICANT CHANGE UP (ref 7–23)
CALCIUM SERPL-MCNC: 8.3 MG/DL — LOW (ref 8.4–10.5)
CHLORIDE SERPL-SCNC: 104 MMOL/L — SIGNIFICANT CHANGE UP (ref 96–108)
CO2 SERPL-SCNC: 26 MMOL/L — SIGNIFICANT CHANGE UP (ref 22–31)
CREAT SERPL-MCNC: 0.74 MG/DL — SIGNIFICANT CHANGE UP (ref 0.5–1.3)
GLUCOSE SERPL-MCNC: 105 MG/DL — HIGH (ref 70–99)
HCT VFR BLD CALC: 37.9 % — SIGNIFICANT CHANGE UP (ref 34.5–45)
HGB BLD-MCNC: 12.4 G/DL — SIGNIFICANT CHANGE UP (ref 11.5–15.5)
MCHC RBC-ENTMCNC: 30 PG — SIGNIFICANT CHANGE UP (ref 27–34)
MCHC RBC-ENTMCNC: 32.7 GM/DL — SIGNIFICANT CHANGE UP (ref 32–36)
MCV RBC AUTO: 91.8 FL — SIGNIFICANT CHANGE UP (ref 80–100)
PLATELET # BLD AUTO: 172 K/UL — SIGNIFICANT CHANGE UP (ref 150–400)
POTASSIUM SERPL-MCNC: 3.4 MMOL/L — LOW (ref 3.5–5.3)
POTASSIUM SERPL-SCNC: 3.4 MMOL/L — LOW (ref 3.5–5.3)
RBC # BLD: 4.13 M/UL — SIGNIFICANT CHANGE UP (ref 3.8–5.2)
RBC # FLD: 13.9 % — SIGNIFICANT CHANGE UP (ref 10.3–14.5)
SODIUM SERPL-SCNC: 141 MMOL/L — SIGNIFICANT CHANGE UP (ref 135–145)
WBC # BLD: 7 K/UL — SIGNIFICANT CHANGE UP (ref 3.8–10.5)
WBC # FLD AUTO: 7 K/UL — SIGNIFICANT CHANGE UP (ref 3.8–10.5)

## 2017-07-20 PROCEDURE — 80048 BASIC METABOLIC PNL TOTAL CA: CPT

## 2017-07-20 PROCEDURE — 87177 OVA AND PARASITES SMEARS: CPT

## 2017-07-20 PROCEDURE — 93005 ELECTROCARDIOGRAM TRACING: CPT

## 2017-07-20 PROCEDURE — 84295 ASSAY OF SERUM SODIUM: CPT

## 2017-07-20 PROCEDURE — 84484 ASSAY OF TROPONIN QUANT: CPT

## 2017-07-20 PROCEDURE — 85027 COMPLETE CBC AUTOMATED: CPT

## 2017-07-20 PROCEDURE — 76937 US GUIDE VASCULAR ACCESS: CPT

## 2017-07-20 PROCEDURE — 99285 EMERGENCY DEPT VISIT HI MDM: CPT | Mod: 25

## 2017-07-20 PROCEDURE — 82272 OCCULT BLD FECES 1-3 TESTS: CPT

## 2017-07-20 PROCEDURE — 85730 THROMBOPLASTIN TIME PARTIAL: CPT

## 2017-07-20 PROCEDURE — 84100 ASSAY OF PHOSPHORUS: CPT

## 2017-07-20 PROCEDURE — 83735 ASSAY OF MAGNESIUM: CPT

## 2017-07-20 PROCEDURE — 82330 ASSAY OF CALCIUM: CPT

## 2017-07-20 PROCEDURE — 71045 X-RAY EXAM CHEST 1 VIEW: CPT

## 2017-07-20 PROCEDURE — 82947 ASSAY GLUCOSE BLOOD QUANT: CPT

## 2017-07-20 PROCEDURE — 82435 ASSAY OF BLOOD CHLORIDE: CPT

## 2017-07-20 PROCEDURE — 80053 COMPREHEN METABOLIC PANEL: CPT

## 2017-07-20 PROCEDURE — 85014 HEMATOCRIT: CPT

## 2017-07-20 PROCEDURE — 82803 BLOOD GASES ANY COMBINATION: CPT

## 2017-07-20 PROCEDURE — 84132 ASSAY OF SERUM POTASSIUM: CPT

## 2017-07-20 PROCEDURE — 83605 ASSAY OF LACTIC ACID: CPT

## 2017-07-20 PROCEDURE — 85610 PROTHROMBIN TIME: CPT

## 2017-07-20 PROCEDURE — 87045 FECES CULTURE AEROBIC BACT: CPT

## 2017-07-20 PROCEDURE — 87046 STOOL CULTR AEROBIC BACT EA: CPT

## 2017-07-20 PROCEDURE — 74177 CT ABD & PELVIS W/CONTRAST: CPT

## 2017-07-20 RX ORDER — METOCLOPRAMIDE HCL 10 MG
1 TABLET ORAL
Qty: 15 | Refills: 0 | OUTPATIENT
Start: 2017-07-20 | End: 2017-07-25

## 2017-07-20 RX ORDER — MOXIFLOXACIN HYDROCHLORIDE TABLETS, 400 MG 400 MG/1
1 TABLET, FILM COATED ORAL
Qty: 16 | Refills: 0 | OUTPATIENT
Start: 2017-07-20 | End: 2017-07-28

## 2017-07-20 RX ORDER — METRONIDAZOLE 500 MG
1 TABLET ORAL
Qty: 24 | Refills: 0 | OUTPATIENT
Start: 2017-07-20 | End: 2017-07-28

## 2017-07-20 RX ORDER — ACETAMINOPHEN 500 MG
2 TABLET ORAL
Qty: 0 | Refills: 0 | COMMUNITY
Start: 2017-07-20

## 2017-07-20 RX ORDER — POTASSIUM CHLORIDE 20 MEQ
20 PACKET (EA) ORAL ONCE
Qty: 0 | Refills: 0 | Status: COMPLETED | OUTPATIENT
Start: 2017-07-20 | End: 2017-07-20

## 2017-07-20 RX ORDER — SACCHAROMYCES BOULARDII 250 MG
1 POWDER IN PACKET (EA) ORAL
Qty: 20 | Refills: 0 | OUTPATIENT
Start: 2017-07-20 | End: 2017-07-30

## 2017-07-20 RX ADMIN — SODIUM CHLORIDE 100 MILLILITER(S): 9 INJECTION INTRAMUSCULAR; INTRAVENOUS; SUBCUTANEOUS at 05:34

## 2017-07-20 RX ADMIN — Medication 20 MILLIEQUIVALENT(S): at 13:15

## 2017-07-20 RX ADMIN — Medication 100 MILLIGRAM(S): at 13:15

## 2017-07-20 RX ADMIN — Medication 200 MILLIGRAM(S): at 05:34

## 2017-07-20 RX ADMIN — Medication 100 MILLIGRAM(S): at 05:34

## 2017-07-20 NOTE — PROGRESS NOTE ADULT - SUBJECTIVE AND OBJECTIVE BOX
Patient is a 57y old  Female who presents with a chief complaint of Bloody diarrhea (18 Jul 2017 19:04)      SUBJECTIVE / OVERNIGHT EVENTS: tolerating a solid diet, still w streaks of blood in her semi formed stool. cleared by GI for DC    MEDICATIONS  (STANDING):  sodium chloride 0.9%. 1000 milliLiter(s) (100 mL/Hr) IV Continuous <Continuous>  ciprofloxacin   IVPB 400 milliGRAM(s) IV Intermittent every 12 hours  metroNIDAZOLE  IVPB 500 milliGRAM(s) IV Intermittent every 8 hours    MEDICATIONS  (PRN):  metoclopramide 5 milliGRAM(s) Oral three times a day PRN Nausea/vomitting  dicyclomine 10 milliGRAM(s) Oral four times a day before meals PRN Cramping  acetaminophen   Tablet. 650 milliGRAM(s) Oral every 6 hours PRN Moderate Pain (4 - 6)      Vital Signs Last 24 Hrs  T(F): 97.9 (07-20-17 @ 07:25), Max: 98.1 (07-20-17 @ 00:33)  HR: 66 (07-20-17 @ 07:25) (63 - 70)  BP: 135/81 (07-20-17 @ 07:25) (132/78 - 144/82)  RR: 18 (07-20-17 @ 07:25) (16 - 18)  SpO2: 96% (07-20-17 @ 07:25) (96% - 98%)  Telemetry:   CAPILLARY BLOOD GLUCOSE        I&O's Summary    19 Jul 2017 07:01  -  20 Jul 2017 07:00  --------------------------------------------------------  IN: 1120 mL / OUT: 1850 mL / NET: -730 mL    20 Jul 2017 07:01  -  20 Jul 2017 11:35  --------------------------------------------------------  IN: 240 mL / OUT: 0 mL / NET: 240 mL        PHYSICAL EXAM:  GENERAL: NAD, well-developed  HEAD:  Atraumatic, Normocephalic  EYES: EOMI, PERRLA, conjunctiva and sclera clear  NECK: Supple, No JVD  CHEST/LUNG: Clear to auscultation bilaterally; No wheeze  HEART: Regular rate and rhythm; No murmurs, rubs, or gallops  ABDOMEN: Soft, Nontender, Nondistended; Bowel sounds present  EXTREMITIES:  2+ Peripheral Pulses, No clubbing, cyanosis, or edema  PSYCH: AAOx3  NEUROLOGY: non-focal  SKIN: No rashes or lesions    LABS:                        12.4   7.00  )-----------( 172      ( 20 Jul 2017 08:43 )             37.9     07-20    141  |  104  |  7   ----------------------------<  105<H>  3.4<L>   |  26  |  0.74    Ca    8.3<L>      20 Jul 2017 08:26  Phos  3.3     07-19  Mg     2.0     07-19    TPro  6.2  /  Alb  3.6  /  TBili  1.7<H>  /  DBili  x   /  AST  18  /  ALT  18  /  AlkPhos  66  07-19    PT/INR - ( 18 Jul 2017 12:58 )   PT: 10.8 sec;   INR: 0.99 ratio         PTT - ( 18 Jul 2017 12:58 )  PTT:24.7 sec  CARDIAC MARKERS ( 19 Jul 2017 05:57 )  x     / <0.01 ng/mL / x     / x     / x      CARDIAC MARKERS ( 18 Jul 2017 19:38 )  x     / <0.01 ng/mL / x     / x     / x              RADIOLOGY & ADDITIONAL TESTS:    Imaging Personally Reviewed:    Consultant(s) Notes Reviewed:      Care Discussed with Consultants/Other Providers:

## 2017-07-20 NOTE — PROGRESS NOTE ADULT - SUBJECTIVE AND OBJECTIVE BOX
[facility], Division of Infectious Diseases  SATNAM Calvillo A. Lee  [phone]    Name: JAYESH COLLADO  Age: 57y  Gender: Female  MRN: [id number]    Interval History--  Notes reviewed  No BM today no abdominal pain    Past Medical History--  Cataract  Fibroids  Colon polyps  Tricuspid regurgitation  Cough  GERD (gastroesophageal reflux disease)  Hypertension  Hypercholesteremia  History of  delivery      For details regarding the patient's social history, family history, and other miscellaneous elements, please refer the initial infectious diseases consultation and/or the admitting history and physical examination for this admission.    Allergies    No Known Allergies    Intolerances        Medications--  Antibiotics:  ciprofloxacin   IVPB 400 milliGRAM(s) IV Intermittent every 12 hours  metroNIDAZOLE  IVPB 500 milliGRAM(s) IV Intermittent every 8 hours    Immunologic:    Other:  sodium chloride 0.9%.  metoclopramide PRN  dicyclomine PRN  acetaminophen   Tablet. PRN      Review of Systems--  A 10-point review of systems was obtained.   Pertinent positives and negatives--  Constitutional: No fevers. No Chills. No Rigors.   Cardiovascular: No chest pain. No palpitations.  Respiratory: No shortness of breath. No cough.  Gastrointestinal: No nausea or vomiting. some diarrhea or constipation.   Psychiatric: Pleasant. Appropriate affect.    Review of systems otherwise negative except as previously noted.    Physical Examination--  Vital Signs: T(F): 97.9 (17 @ 07:25), Max: 98.1 (17 @ 00:33)  HR: 66 (17 @ 07:25)  BP: 135/81 (17 @ 07:25)  RR: 18 (17 @ 07:25)  SpO2: 96% (17 @ 07:25)  Wt(kg): --  General: Nontoxic-appearing Female in no acute distress.  HEENT: AT/NC. Anicteric. Conjunctiva pink and moist. Oropharynx clear. Dentition fair.  Neck: Not rigid. No sense of mass.  Nodes: None palpable.  Lungs: Clear bilaterally without rales, wheezing or rhonchi  Heart: Regular rate and rhythm. No Murmur.   Abdomen: Bowel sounds present and normoactive. Soft. Nondistended.   Back: No spinal tenderness. No costovertebral angle tenderness.   Extremities: No cyanosis or clubbing. No edema.   Skin: Warm. Dry. Good turgor. No rash. No vasculitic stigmata.         Laboratory Studies--  CBC                        12.4   7.00  )-----------( 172      ( 2017 08:43 )             37.9       Chemistries      141  |  104  |  7   ----------------------------<  105<H>  3.4<L>   |  26  |  0.74    Ca    8.3<L>      2017 08:26  Phos  3.3       Mg     2.0         TPro  6.2  /  Alb  3.6  /  TBili  1.7<H>  /  DBili  x   /  AST  18  /  ALT  18  /  AlkPhos  66        Culture Data

## 2017-07-20 NOTE — PROGRESS NOTE ADULT - ASSESSMENT
58 yo woman with   1. descending colitis -- infectious versus ischemia.   change ABx to po Cipro and Flagyl. Add daily Florastor.   tolerating low residue diet. 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 Patient states she had recent EGD as outpatient for reflux which was unremarkable. Consider interval imaging vs. EGD/EUS as outpatient.  2. abn EKG. no ischemic Sx, awaiting TTE, if not done here Echo and  ischemic w/up on outptn basis as per cardiology. F/U w Dr. Tinajero.

## 2017-07-20 NOTE — PROGRESS NOTE ADULT - ASSESSMENT
57 with descending colitis -- infectious versus ischemia. Clinically improved on Abx without abdominal pain or tenderness.  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 Patient states she had recent EGD as outpatient for reflux which was unremarkable. Consider interval imaging vs. EGD/EUS as outpatient.  Okay to D/C home from [facility] and complete course of cipro/flagyl.  To f/u with Dr. Zuleta next week.

## 2017-07-20 NOTE — PROGRESS NOTE ADULT - SUBJECTIVE AND OBJECTIVE BOX
Subjective: Patient seen and examined. No new events except as noted.   Abdominal pain improved  Small amount of blood in stool this am   REVIEW OF SYSTEMS:    CONSTITUTIONAL: No weakness, fevers or chills  EYES/ENT: No visual changes;  No vertigo or throat pain   NECK: No pain or stiffness  RESPIRATORY: No cough, wheezing, hemoptysis; No shortness of breath  CARDIOVASCULAR: No chest pain or palpitations  GASTROINTESTINAL: No abdominal or epigastric pain. No nausea, vomiting, or hematemesis; No diarrhea or constipation. No melena or hematochezia.  GENITOURINARY: No dysuria, frequency or hematuria  NEUROLOGICAL: No numbness or weakness  SKIN: No itching, burning, rashes, or lesions   All other review of systems is negative unless indicated above.    MEDICATIONS:  MEDICATIONS  (STANDING):  sodium chloride 0.9%. 1000 milliLiter(s) (100 mL/Hr) IV Continuous <Continuous>  ciprofloxacin   IVPB 400 milliGRAM(s) IV Intermittent every 12 hours  metroNIDAZOLE  IVPB 500 milliGRAM(s) IV Intermittent every 8 hours      PHYSICAL EXAM:  T(C): 36.6 (07-20-17 @ 07:25), Max: 36.7 (07-19-17 @ 17:13)  HR: 66 (07-20-17 @ 07:25) (63 - 70)  BP: 135/81 (07-20-17 @ 07:25) (132/78 - 144/82)  RR: 18 (07-20-17 @ 07:25) (16 - 18)  SpO2: 96% (07-20-17 @ 07:25) (96% - 98%)  Wt(kg): --  I&O's Summary    19 Jul 2017 07:01  -  20 Jul 2017 07:00  --------------------------------------------------------  IN: 1120 mL / OUT: 1850 mL / NET: -730 mL          Appearance: Normal	  HEENT:   Normal oral mucosa, PERRL, EOMI	  Lymphatic: No lymphadenopathy , no edema  Cardiovascular: Normal S1 S2, No JVD, No murmurs , Peripheral pulses palpable 2+ bilaterally  Respiratory: Lungs clear to auscultation, normal effort 	  Gastrointestinal:  Soft, Non-tender, + BS	  Skin: No rashes, No ecchymoses, No cyanosis, warm to touch  Musculoskeletal: Normal range of motion, normal strength  Psychiatry:  Mood & affect appropriate  Ext: No edema      LABS:    CARDIAC MARKERS:  CARDIAC MARKERS ( 19 Jul 2017 05:57 )  x     / <0.01 ng/mL / x     / x     / x      CARDIAC MARKERS ( 18 Jul 2017 19:38 )  x     / <0.01 ng/mL / x     / x     / x                                    12.4   7.00  )-----------( 172      ( 20 Jul 2017 08:43 )             37.9     07-20    141  |  104  |  7   ----------------------------<  105<H>  3.4<L>   |  26  |  0.74    Ca    8.3<L>      20 Jul 2017 08:26  Phos  3.3     07-19  Mg     2.0     07-19    TPro  6.2  /  Alb  3.6  /  TBili  1.7<H>  /  DBili  x   /  AST  18  /  ALT  18  /  AlkPhos  66  07-19    proBNP:   Lipid Profile:   HgA1c:   TSH:           TELEMETRY: 	    ECG:  	  RADIOLOGY:   DIAGNOSTIC TESTING:  [ ] Echocardiogram:  [ ]  Catheterization:  [ ] Stress Test:    OTHER:

## 2017-07-20 NOTE — CHART NOTE - NSCHARTNOTEFT_GEN_A_CORE
Request from Dr. Zendejas  to facilitate discharge.  Medication reconciliation  reviewed, revised and resolved with Dr. Zendejas who has medically cleared pt for discharge with follow up as advised. Please refer to discharge note for detailed hospital course.   Donna FATIMA  [id number]

## 2017-07-20 NOTE — DISCHARGE NOTE ADULT - SECONDARY DIAGNOSIS.
Essential hypertension Hypercholesteremia Gastroesophageal reflux disease, esophagitis presence not specified

## 2017-07-20 NOTE — PROGRESS NOTE ADULT - PROBLEM SELECTOR PLAN 1
f/u stool studies   treat for infectious colitis  can change to oral ciprofloxacin 500 mg bid  flagyl 500mg po tid til July 28, 2017  follow h/h
On IV abx  GI and ID following

## 2017-07-20 NOTE — DISCHARGE NOTE ADULT - CARE PLAN
Principal Discharge DX:	Colitis  Goal:	pain free, complete course of abx  Instructions for follow-up, activity and diet:	follow up with GI - Dr. Allen  Secondary Diagnosis:	Essential hypertension  Instructions for follow-up, activity and diet:	Low salt diet  Activity as tolerated.  Take all medication as prescribed.  Follow up with your medical doctor for routine blood pressure monitoring at your next visit.  Notify your doctor if you have any of the following symptoms:   Dizziness, Lightheadedness, Blurry vision, Headache, Chest pain, Shortness of breath  Secondary Diagnosis:	Hypercholesteremia  Instructions for follow-up, activity and diet:	Continue with your cholesterol medications. Eat a heart healthy diet that is low in saturated fats and salt, and includes whole grains, fruits, vegetables and lean protein; exercise regularly (consult with your physician or cardiologist first); maintain a heart healthy weight; if you smoke - quit (A resource to help you stop smoking is the [facility] Tobacco Control – phone number [phone].). Continue to follow with your primary physician or cardiologist.  Seek medical help for dizziness, Lightheadedness, Blurry vision, Headache, Chest pain, Shortness of breath  Secondary Diagnosis:	Gastroesophageal reflux disease, esophagitis presence not specified  Instructions for follow-up, activity and diet:	Take medication as recommended  Acid reflux is when the acid that is normally in your stomach backs up into the esophagus, tube that carries food from your mouth to your stomach [place] as"gastroesophageal reflux disease," or GERD.   The symptoms include - Burning in the chest, known as heartburn, burning in the throat or an acid taste in the throat, stomach or chest pain, trouble swallowing, unexplained cough   Measures to follow to avoid symptoms are lose weight (if you are overweight), raise the head of your bed by 6 to 8 inches, avoid foods that make your symptoms worse like coffee, chocolate, alcohol, fatty foods.  Seek medical if your symptoms are severe or last a long time.   Seek immediate help for trouble swallowing, feel as though food gets "stuck" on the way down, choke when you eat, vomit blood, dark or black stool, chest pain Principal Discharge DX:	Colitis  Goal:	pain free, complete course of abx  Instructions for follow-up, activity and diet:	follow up with GI - Dr. Allen  Secondary Diagnosis:	Essential hypertension  Instructions for follow-up, activity and diet:	Low salt diet  Activity as tolerated.  Take all medication as prescribed.  Follow up with your medical doctor for routine blood pressure monitoring at your next visit.  Notify your doctor if you have any of the following symptoms:   Dizziness, Lightheadedness, Blurry vision, Headache, Chest pain, Shortness of breath  Secondary Diagnosis:	Hypercholesteremia  Instructions for follow-up, activity and diet:	Continue with your cholesterol medications. Eat a heart healthy diet that is low in saturated fats and salt, and includes whole grains, fruits, vegetables and lean protein; exercise regularly (consult with your physician or cardiologist first); maintain a heart healthy weight; if you smoke - quit (A resource to help you stop smoking is the [facility] Tobacco Control – phone number [phone].). Continue to follow with your primary physician or cardiologist.  Seek medical help for dizziness, Lightheadedness, Blurry vision, Headache, Chest pain, Shortness of breath  Secondary Diagnosis:	Gastroesophageal reflux disease, esophagitis presence not specified  Instructions for follow-up, activity and diet:	Take medication as recommended  Acid reflux is when the acid that is normally in your stomach backs up into the esophagus, tube that carries food from your mouth to your stomach [place] as"gastroesophageal reflux disease," or GERD.   The symptoms include - Burning in the chest, known as heartburn, burning in the throat or an acid taste in the throat, stomach or chest pain, trouble swallowing, unexplained cough   Measures to follow to avoid symptoms are lose weight (if you are overweight), raise the head of your bed by 6 to 8 inches, avoid foods that make your symptoms worse like coffee, chocolate, alcohol, fatty foods.  Seek medical if your symptoms are severe or last a long time.   Seek immediate help for trouble swallowing, feel as though food gets "stuck" on the way down, choke when you eat, vomit blood, dark or black stool, chest pain Principal Discharge DX:	Colitis  Goal:	pain free, complete course of abx  Instructions for follow-up, activity and diet:	follow up with GI - Dr. Allen  Secondary Diagnosis:	Essential hypertension  Instructions for follow-up, activity and diet:	Low salt diet  Activity as tolerated.  Take all medication as prescribed.  Follow up with your medical doctor for routine blood pressure monitoring at your next visit.  Notify your doctor if you have any of the following symptoms:   Dizziness, Lightheadedness, Blurry vision, Headache, Chest pain, Shortness of breath  Secondary Diagnosis:	Hypercholesteremia  Instructions for follow-up, activity and diet:	Continue with your cholesterol medications. Eat a heart healthy diet that is low in saturated fats and salt, and includes whole grains, fruits, vegetables and lean protein; exercise regularly (consult with your physician or cardiologist first); maintain a heart healthy weight; if you smoke - quit (A resource to help you stop smoking is the [facility] Tobacco Control – phone number [phone].). Continue to follow with your primary physician or cardiologist.  Seek medical help for dizziness, Lightheadedness, Blurry vision, Headache, Chest pain, Shortness of breath  Secondary Diagnosis:	Gastroesophageal reflux disease, esophagitis presence not specified  Instructions for follow-up, activity and diet:	Take medication as recommended  Acid reflux is when the acid that is normally in your stomach backs up into the esophagus, tube that carries food from your mouth to your stomach [place] as"gastroesophageal reflux disease," or GERD.   The symptoms include - Burning in the chest, known as heartburn, burning in the throat or an acid taste in the throat, stomach or chest pain, trouble swallowing, unexplained cough   Measures to follow to avoid symptoms are lose weight (if you are overweight), raise the head of your bed by 6 to 8 inches, avoid foods that make your symptoms worse like coffee, chocolate, alcohol, fatty foods.  Seek medical if your symptoms are severe or last a long time.   Seek immediate help for trouble swallowing, feel as though food gets "stuck" on the way down, choke when you eat, vomit blood, dark or black stool, chest pain Principal Discharge DX:	Colitis  Goal:	pain free, complete course of abx  Instructions for follow-up, activity and diet:	follow up with GI - Dr. Allen  Secondary Diagnosis:	Essential hypertension  Instructions for follow-up, activity and diet:	Low salt diet  Activity as tolerated.  Take all medication as prescribed.  Follow up with your medical doctor for routine blood pressure monitoring at your next visit.  Notify your doctor if you have any of the following symptoms:   Dizziness, Lightheadedness, Blurry vision, Headache, Chest pain, Shortness of breath  Secondary Diagnosis:	Hypercholesteremia  Instructions for follow-up, activity and diet:	Continue with your cholesterol medications. Eat a heart healthy diet that is low in saturated fats and salt, and includes whole grains, fruits, vegetables and lean protein; exercise regularly (consult with your physician or cardiologist first); maintain a heart healthy weight; if you smoke - quit (A resource to help you stop smoking is the [facility] Tobacco [facility] – phone number [phone].). Continue to follow with your primary physician or cardiologist.  Seek medical help for dizziness, Lightheadedness, Blurry vision, Headache, Chest pain, Shortness of breath  Secondary Diagnosis:	Gastroesophageal reflux disease, esophagitis presence not specified  Instructions for follow-up, activity and diet:	Take medication as recommended  Acid reflux is when the acid that is normally in your stomach backs up into the esophagus, tube that carries food from your mouth to your stomach [place] as"gastroesophageal reflux disease," or GERD.   The symptoms include - Burning in the chest, known as heartburn, burning in the throat or an acid taste in the throat, stomach or chest pain, trouble swallowing, unexplained cough   Measures to follow to avoid symptoms are lose weight (if you are overweight), raise the head of your bed by 6 to 8 inches, avoid foods that make your symptoms worse like coffee, chocolate, alcohol, fatty foods.  Seek medical if your symptoms are severe or last a long time.   Seek immediate help for trouble swallowing, feel as though food gets "stuck" on the way down, choke when you eat, vomit blood, dark or black stool, chest pain

## 2017-07-20 NOTE — PROGRESS NOTE ADULT - ASSESSMENT
57F with brbpr found to have colitis on ct scan  on cipro flagyl  although hemoglobin slowly going down.

## 2017-07-20 NOTE — DISCHARGE NOTE ADULT - MEDICATION SUMMARY - MEDICATIONS TO TAKE
I will START or STAY ON the medications listed below when I get home from the hospital:    Flagyl 500 mg oral tablet  -- 1 tab(s) by mouth every 8 hours  -- Do not drink alcoholic beverages when taking this medication.  Finish all this medication unless otherwise directed by prescriber.  May discolor urine or feces.    -- Indication: For Colitis    acetaminophen 325 mg oral tablet  -- 2 tab(s) by mouth every 6 hours, As needed, Moderate Pain (4 - 6)  -- Indication: For Colitis    metoclopramide 5 mg oral tablet  -- 1 tab(s) by mouth 3 times a day, As needed, Nausea/vomitting  -- Indication: For Colitis    simvastatin 20 mg oral tablet  -- 1 tab(s) by mouth once a day (at bedtime)  -- Indication: For Hypercholesteremia    hydroCHLOROthiazide 25 mg oral tablet  -- 1 tab(s) by mouth once a day  -- Indication: For Essential hypertension    dicyclomine 10 mg oral capsule  -- 1 cap(s) by mouth 4 times a day (before meals and at bedtime), As needed, Cramping  -- Indication: For Colitis    Florastor 250 mg oral capsule  -- 1 cap(s) by mouth 2 times a day  -- Indication: For Colitis    Cipro 500 mg oral tablet  -- 1 tab(s) by mouth every 12 hours  -- Avoid prolonged or excessive exposure to direct and/or artificial sunlight while taking this medication.  Check with your doctor before becoming pregnant.  Do not take dairy products, antacids, or iron preparations within one hour of this medication.  Finish all this medication unless otherwise directed by prescriber.  Medication should be taken with plenty of water.    -- Indication: For Colitis

## 2017-07-20 NOTE — DISCHARGE NOTE ADULT - PLAN OF CARE
pain free, complete course of abx follow up with GI - Dr. Allen Low salt diet  Activity as tolerated.  Take all medication as prescribed.  Follow up with your medical doctor for routine blood pressure monitoring at your next visit.  Notify your doctor if you have any of the following symptoms:   Dizziness, Lightheadedness, Blurry vision, Headache, Chest pain, Shortness of breath Continue with your cholesterol medications. Eat a heart healthy diet that is low in saturated fats and salt, and includes whole grains, fruits, vegetables and lean protein; exercise regularly (consult with your physician or cardiologist first); maintain a heart healthy weight; if you smoke - quit (A resource to help you stop smoking is the [facility] Tobacco Control – phone number [phone].). Continue to follow with your primary physician or cardiologist.  Seek medical help for dizziness, Lightheadedness, Blurry vision, Headache, Chest pain, Shortness of breath Take medication as recommended  Acid reflux is when the acid that is normally in your stomach backs up into the esophagus, tube that carries food from your mouth to your stomach [place] as"gastroesophageal reflux disease," or GERD.   The symptoms include - Burning in the chest, known as heartburn, burning in the throat or an acid taste in the throat, stomach or chest pain, trouble swallowing, unexplained cough   Measures to follow to avoid symptoms are lose weight (if you are overweight), raise the head of your bed by 6 to 8 inches, avoid foods that make your symptoms worse like coffee, chocolate, alcohol, fatty foods.  Seek medical if your symptoms are severe or last a long time.   Seek immediate help for trouble swallowing, feel as though food gets "stuck" on the way down, choke when you eat, vomit blood, dark or black stool, chest pain

## 2017-07-20 NOTE — DISCHARGE NOTE ADULT - PATIENT PORTAL LINK FT
“You can access the [facility] Patient Portal, offered by [facility], by registering with the following website: [email or web address]”

## 2017-07-20 NOTE — PROGRESS NOTE ADULT - SUBJECTIVE AND OBJECTIVE BOX
INTERVAL HPI/OVERNIGHT EVENTS:  Feeling better today- no abdominal pain.  Reports several episodes of loose stool with blood last night.  No BM today.  Tolerating a regular diet.  No F/C.  No CP/SOB    MEDICATIONS  (STANDING):  sodium chloride 0.9%. 1000 milliLiter(s) (100 mL/Hr) IV Continuous <Continuous>  ciprofloxacin   IVPB 400 milliGRAM(s) IV Intermittent every 12 hours  metroNIDAZOLE  IVPB 500 milliGRAM(s) IV Intermittent every 8 hours    MEDICATIONS  (PRN):  metoclopramide 5 milliGRAM(s) Oral three times a day PRN Nausea/vomitting  dicyclomine 10 milliGRAM(s) Oral four times a day before meals PRN Cramping  acetaminophen   Tablet. 650 milliGRAM(s) Oral every 6 hours PRN Moderate Pain (4 - 6)      Allergies    No Known Allergies            Vital Signs Last 24 Hrs  T(C): 36.6 (20 Jul 2017 07:25), Max: 36.7 (19 Jul 2017 17:13)  T(F): 97.9 (20 Jul 2017 07:25), Max: 98.1 (20 Jul 2017 00:33)  HR: 66 (20 Jul 2017 07:25) (63 - 70)  BP: 135/81 (20 Jul 2017 07:25) (132/78 - 144/82)  BP(mean): --  RR: 18 (20 Jul 2017 07:25) (16 - 18)  SpO2: 96% (20 Jul 2017 07:25) (96% - 98%)    PHYSICAL EXAM:      Constitutional: NAD, well-developed  HEENT: anicteric  Respiratory: clear anteriorly  Cardiovascular: S1 and S2, RRR,  Gastrointestinal: soft; NTND; NABS  Neurological: A/O x 3, no focal deficits  Psychiatric: Normal mood, normal affect  Skin: No rashes    LABS:                        12.4   7.00  )-----------( 172      ( 20 Jul 2017 08:43 )             37.9     Hemoglobin: 12.4 g/dL (07-20 @ 08:43)  Hemoglobin: 13.1 g/dL (07-19 @ 07:32)  Hemoglobin: 14.1 g/dL (07-18 @ 19:38)  Hemoglobin: 14.3 g/dL (07-18 @ 15:45)  Hemoglobin: 15.4 g/dL (07-18 @ 11:56)      07-20    141  |  104  |  7   ----------------------------<  105<H>  3.4<L>   |  26  |  0.74    Ca    8.3<L>      20 Jul 2017 08:26  Phos  3.3     07-19  Mg     2.0     07-19    TPro  6.2  /  Alb  3.6  /  TBili  1.7<H>  /  DBili  x   /  AST  18  /  ALT  18  /  AlkPhos  66  07-19    Bilirubin Total, Serum: 1.7 mg/dL (07-19 @ 05:57)  Bilirubin Total, Serum: 1.2 mg/dL (07-18 @ 11:56)      Aspartate Aminotransferase (AST/SGOT): 18 U/L (07-19 @ 05:57)  Aspartate Aminotransferase (AST/SGOT): 26 U/L (07-18 @ 11:56)    Alanine Aminotransferase (ALT/SGPT): 18 U/L  (07-19 @ 05:57)  Alanine Aminotransferase (ALT/SGPT): 27 U/L  (07-18 @ 11:56)          RADIOLOGY & ADDITIONAL TESTS:

## 2017-07-21 LAB
CULTURE RESULTS: SIGNIFICANT CHANGE UP
SPECIMEN SOURCE: SIGNIFICANT CHANGE UP

## 2017-07-25 LAB
CULTURE RESULTS: SIGNIFICANT CHANGE UP
SPECIMEN SOURCE: SIGNIFICANT CHANGE UP

## 2017-11-02 PROBLEM — Z00.00 ENCOUNTER FOR PREVENTIVE HEALTH EXAMINATION: Status: ACTIVE | Noted: 2017-11-02

## 2017-11-02 RX ORDER — SIMVASTATIN 20 MG/1
1 TABLET, FILM COATED ORAL
Qty: 0 | Refills: 0 | COMMUNITY

## 2017-11-02 RX ORDER — ASPIRIN/CALCIUM CARB/MAGNESIUM 324 MG
1 TABLET ORAL
Qty: 0 | Refills: 0 | COMMUNITY

## 2018-01-10 ENCOUNTER — RESULT REVIEW (OUTPATIENT)
Age: 58
End: 2018-01-10

## 2018-05-12 PROBLEM — K21.9 GASTRO-ESOPHAGEAL REFLUX DISEASE WITHOUT ESOPHAGITIS: Chronic | Status: ACTIVE | Noted: 2017-07-18

## 2018-05-12 PROBLEM — I07.1 RHEUMATIC TRICUSPID INSUFFICIENCY: Chronic | Status: ACTIVE | Noted: 2017-07-18

## 2018-05-12 PROBLEM — H26.9 UNSPECIFIED CATARACT: Chronic | Status: ACTIVE | Noted: 2017-07-18

## 2018-05-12 PROBLEM — K63.5 POLYP OF COLON: Chronic | Status: ACTIVE | Noted: 2017-07-18

## 2018-05-12 PROBLEM — D25.9 LEIOMYOMA OF UTERUS, UNSPECIFIED: Chronic | Status: ACTIVE | Noted: 2017-07-18

## 2018-05-12 PROBLEM — E78.00 PURE HYPERCHOLESTEROLEMIA, UNSPECIFIED: Chronic | Status: ACTIVE | Noted: 2017-07-18

## 2018-05-12 PROBLEM — I10 ESSENTIAL (PRIMARY) HYPERTENSION: Chronic | Status: ACTIVE | Noted: 2017-07-18

## 2018-05-12 PROBLEM — R05 COUGH: Chronic | Status: ACTIVE | Noted: 2017-07-18
